# Patient Record
Sex: FEMALE | Race: WHITE | Employment: OTHER | URBAN - METROPOLITAN AREA
[De-identification: names, ages, dates, MRNs, and addresses within clinical notes are randomized per-mention and may not be internally consistent; named-entity substitution may affect disease eponyms.]

---

## 2017-01-09 ENCOUNTER — OFFICE VISIT (OUTPATIENT)
Dept: ORTHOPEDIC SURGERY | Age: 77
End: 2017-01-09

## 2017-01-09 VITALS
BODY MASS INDEX: 27.47 KG/M2 | DIASTOLIC BLOOD PRESSURE: 81 MMHG | HEIGHT: 67 IN | WEIGHT: 175.04 LBS | HEART RATE: 58 BPM | SYSTOLIC BLOOD PRESSURE: 185 MMHG

## 2017-01-09 DIAGNOSIS — T84.89XD POSTOPERATIVE STIFFNESS OF TOTAL KNEE REPLACEMENT, SUBSEQUENT ENCOUNTER: ICD-10-CM

## 2017-01-09 DIAGNOSIS — Z96.659 POSTOPERATIVE STIFFNESS OF TOTAL KNEE REPLACEMENT, SUBSEQUENT ENCOUNTER: ICD-10-CM

## 2017-01-09 DIAGNOSIS — M17.12 PRIMARY OSTEOARTHRITIS OF LEFT KNEE: Primary | Chronic | ICD-10-CM

## 2017-01-09 DIAGNOSIS — M25.669 POSTOPERATIVE STIFFNESS OF TOTAL KNEE REPLACEMENT, SUBSEQUENT ENCOUNTER: ICD-10-CM

## 2017-01-09 DIAGNOSIS — T84.82XD ARTHROFIBROSIS OF TOTAL KNEE ARTHROPLASTY, SUBSEQUENT ENCOUNTER: ICD-10-CM

## 2017-01-09 DIAGNOSIS — Z96.652 STATUS POST TOTAL LEFT KNEE REPLACEMENT: ICD-10-CM

## 2017-01-09 PROCEDURE — 99213 OFFICE O/P EST LOW 20 MIN: CPT | Performed by: ORTHOPAEDIC SURGERY

## 2017-01-09 RX ORDER — ASPIRIN 325 MG
325 TABLET ORAL DAILY
COMMUNITY

## 2017-01-09 RX ORDER — SOLIFENACIN SUCCINATE 10 MG/1
5 TABLET, FILM COATED ORAL DAILY
COMMUNITY
End: 2019-11-20

## 2017-01-09 RX ORDER — GABAPENTIN 300 MG/1
300 CAPSULE ORAL 2 TIMES DAILY
Qty: 60 CAPSULE | Refills: 3 | Status: SHIPPED | OUTPATIENT
Start: 2017-01-09 | End: 2017-10-09 | Stop reason: SDUPTHER

## 2017-01-09 RX ORDER — OXYBUTYNIN CHLORIDE 5 MG/1
5 TABLET, EXTENDED RELEASE ORAL DAILY
COMMUNITY
End: 2019-11-20

## 2017-10-09 ENCOUNTER — OFFICE VISIT (OUTPATIENT)
Dept: ORTHOPEDIC SURGERY | Age: 77
End: 2017-10-09

## 2017-10-09 VITALS
WEIGHT: 175 LBS | BODY MASS INDEX: 27.47 KG/M2 | SYSTOLIC BLOOD PRESSURE: 128 MMHG | HEIGHT: 67 IN | HEART RATE: 63 BPM | DIASTOLIC BLOOD PRESSURE: 77 MMHG

## 2017-10-09 DIAGNOSIS — M25.669 POSTOPERATIVE STIFFNESS OF TOTAL KNEE REPLACEMENT, SUBSEQUENT ENCOUNTER: ICD-10-CM

## 2017-10-09 DIAGNOSIS — T84.82XD ARTHROFIBROSIS OF TOTAL KNEE ARTHROPLASTY, SUBSEQUENT ENCOUNTER: Primary | ICD-10-CM

## 2017-10-09 DIAGNOSIS — Z96.653 STATUS POST TOTAL BILATERAL KNEE REPLACEMENT: ICD-10-CM

## 2017-10-09 DIAGNOSIS — Z96.659 POSTOPERATIVE STIFFNESS OF TOTAL KNEE REPLACEMENT, SUBSEQUENT ENCOUNTER: ICD-10-CM

## 2017-10-09 DIAGNOSIS — T84.89XD POSTOPERATIVE STIFFNESS OF TOTAL KNEE REPLACEMENT, SUBSEQUENT ENCOUNTER: ICD-10-CM

## 2017-10-09 PROCEDURE — 73562 X-RAY EXAM OF KNEE 3: CPT | Performed by: ORTHOPAEDIC SURGERY

## 2017-10-09 PROCEDURE — 20610 DRAIN/INJ JOINT/BURSA W/O US: CPT | Performed by: ORTHOPAEDIC SURGERY

## 2017-10-09 PROCEDURE — 99213 OFFICE O/P EST LOW 20 MIN: CPT | Performed by: ORTHOPAEDIC SURGERY

## 2017-10-09 RX ORDER — EZETIMIBE 10 MG/1
TABLET ORAL
COMMUNITY
Start: 2017-08-28 | End: 2019-11-20

## 2017-10-09 RX ORDER — HYDROCODONE BITARTRATE AND ACETAMINOPHEN 5; 325 MG/1; MG/1
TABLET ORAL
COMMUNITY

## 2017-10-09 RX ORDER — TROSPIUM CHLORIDE 20 MG/1
20 TABLET, FILM COATED ORAL
COMMUNITY
Start: 2017-08-01 | End: 2019-11-20

## 2017-10-09 RX ORDER — GABAPENTIN 300 MG/1
300 CAPSULE ORAL 3 TIMES DAILY
Qty: 90 CAPSULE | Refills: 0 | Status: SHIPPED | OUTPATIENT
Start: 2017-10-09

## 2017-10-09 ASSESSMENT — ENCOUNTER SYMPTOMS
BACK PAIN: 1
SHORTNESS OF BREATH: 1

## 2017-10-09 NOTE — PROGRESS NOTES
History of Present Illness:    Barbara Brown is a 68 y.o. female   follow-up evaluation for bilateral total knees, left knee, doing well. Right knee having pain      Chief Complaint , a summary of previous treatments, injury, and current reason for the visit:    She's had persistent pain at the MP aspect of this wound incision site. It's not in the knee, but the scar site itself at the tibial tubercle with hypersensitivity. It was improved with gabapentin    Examination:    No evidence of infection. Extreme sensitivity. The infrapatellar tendon area. Saphenous nerve irritation with direct pain to pressure        Office Procedures:  Orders Placed This Encounter   Procedures    XR KNEE RIGHT (3 VIEWS)     Order Specific Question:   Reason for exam:     Answer:   Pain and discomfort right knee         X-ray interpretation:  X-rays:    3 view x-rays right knee. Right knee. An anatomic alignment. There is no component abnormality    Treatment Plan:  Recommendation is injection at the infrapatellar tendon area  PROCEDURE NOTE: Right KNEE ASPIRATION & INJECTION  10/9/2017 at 5:25 PM   Procedure: Aspiration & Injection  Verbal consent was obtained. Risks and benefits were explained. Questions were encouraged and answered. Timeout Verification Completed including:    Correct patient: Barbara Brown was identified    Correct procedure    Correct site & side    Correct equipment and supplies          The aspiration/injection site (superolateral) was prepped with Chlora-Prep using aseptic technique and the knee aspiration and intra-articular injection was performed with ethyl chloride & 1% lidocaine (2 ml) for anesthetic:      ARTHROCENTESIS:  Injection of treatment. Following negative aspiration at the area of the inferior tibial tubercle tendon area directly into the area of the saphenous nerve    INJECTION:  Depomedrol 4ml (40 mg/ml = 160 mg total) was injected.   A sterile adhesive dressing was applied. Post procedure: The patient tolerated the treatment well. Instructions to patient:  Appropriate post aspiration/injections instructions were given to the patient. Final impresson and disposition: Right total knee with excellent alignment. Good range of motion. Injection given. 2.  Initiate short course of gabapentin, no refills of been given warned that this is now a control product. All refills much comfortable family doctor                      Rafita Malin M.D. This dictation was performed with a verbal recognition program and it was checked for errors. It is possible that there are still dictated errors within this office note. Any errors should be brought immediately to my attention for correction.   All efforts were made to ensure that this office note is accurate

## 2017-10-30 RX ORDER — METHYLPREDNISOLONE ACETATE 80 MG/ML
80 INJECTION, SUSPENSION INTRA-ARTICULAR; INTRALESIONAL; INTRAMUSCULAR; SOFT TISSUE ONCE
Status: SHIPPED | OUTPATIENT
Start: 2017-10-30

## 2019-11-20 ENCOUNTER — OFFICE VISIT (OUTPATIENT)
Dept: ORTHOPEDIC SURGERY | Age: 79
End: 2019-11-20
Payer: MEDICARE

## 2019-11-20 VITALS
SYSTOLIC BLOOD PRESSURE: 150 MMHG | WEIGHT: 175 LBS | DIASTOLIC BLOOD PRESSURE: 70 MMHG | HEART RATE: 62 BPM | HEIGHT: 67 IN | BODY MASS INDEX: 27.47 KG/M2

## 2019-11-20 DIAGNOSIS — M79.641 RIGHT HAND PAIN: Primary | ICD-10-CM

## 2019-11-20 DIAGNOSIS — M65.331 TRIGGER MIDDLE FINGER OF RIGHT HAND: ICD-10-CM

## 2019-11-20 PROCEDURE — 4040F PNEUMOC VAC/ADMIN/RCVD: CPT | Performed by: ORTHOPAEDIC SURGERY

## 2019-11-20 PROCEDURE — 1123F ACP DISCUSS/DSCN MKR DOCD: CPT | Performed by: ORTHOPAEDIC SURGERY

## 2019-11-20 PROCEDURE — G8400 PT W/DXA NO RESULTS DOC: HCPCS | Performed by: ORTHOPAEDIC SURGERY

## 2019-11-20 PROCEDURE — G8598 ASA/ANTIPLAT THER USED: HCPCS | Performed by: ORTHOPAEDIC SURGERY

## 2019-11-20 PROCEDURE — 20605 DRAIN/INJ JOINT/BURSA W/O US: CPT | Performed by: ORTHOPAEDIC SURGERY

## 2019-11-20 PROCEDURE — G8427 DOCREV CUR MEDS BY ELIG CLIN: HCPCS | Performed by: ORTHOPAEDIC SURGERY

## 2019-11-20 PROCEDURE — 1036F TOBACCO NON-USER: CPT | Performed by: ORTHOPAEDIC SURGERY

## 2019-11-20 PROCEDURE — G8484 FLU IMMUNIZE NO ADMIN: HCPCS | Performed by: ORTHOPAEDIC SURGERY

## 2019-11-20 PROCEDURE — 1090F PRES/ABSN URINE INCON ASSESS: CPT | Performed by: ORTHOPAEDIC SURGERY

## 2019-11-20 PROCEDURE — G8417 CALC BMI ABV UP PARAM F/U: HCPCS | Performed by: ORTHOPAEDIC SURGERY

## 2019-11-20 PROCEDURE — 99203 OFFICE O/P NEW LOW 30 MIN: CPT | Performed by: ORTHOPAEDIC SURGERY

## 2019-12-18 ENCOUNTER — OFFICE VISIT (OUTPATIENT)
Dept: ORTHOPEDIC SURGERY | Age: 79
End: 2019-12-18
Payer: MEDICARE

## 2019-12-18 VITALS
SYSTOLIC BLOOD PRESSURE: 158 MMHG | BODY MASS INDEX: 27.47 KG/M2 | WEIGHT: 175 LBS | HEART RATE: 62 BPM | DIASTOLIC BLOOD PRESSURE: 78 MMHG | HEIGHT: 67 IN

## 2019-12-18 DIAGNOSIS — M79.641 RIGHT HAND PAIN: ICD-10-CM

## 2019-12-18 DIAGNOSIS — M65.331 TRIGGER MIDDLE FINGER OF RIGHT HAND: Primary | ICD-10-CM

## 2019-12-18 PROCEDURE — 1123F ACP DISCUSS/DSCN MKR DOCD: CPT | Performed by: ORTHOPAEDIC SURGERY

## 2019-12-18 PROCEDURE — G8598 ASA/ANTIPLAT THER USED: HCPCS | Performed by: ORTHOPAEDIC SURGERY

## 2019-12-18 PROCEDURE — G8484 FLU IMMUNIZE NO ADMIN: HCPCS | Performed by: ORTHOPAEDIC SURGERY

## 2019-12-18 PROCEDURE — 99212 OFFICE O/P EST SF 10 MIN: CPT | Performed by: ORTHOPAEDIC SURGERY

## 2019-12-18 PROCEDURE — 4040F PNEUMOC VAC/ADMIN/RCVD: CPT | Performed by: ORTHOPAEDIC SURGERY

## 2019-12-18 PROCEDURE — 1036F TOBACCO NON-USER: CPT | Performed by: ORTHOPAEDIC SURGERY

## 2019-12-18 PROCEDURE — G8417 CALC BMI ABV UP PARAM F/U: HCPCS | Performed by: ORTHOPAEDIC SURGERY

## 2019-12-18 PROCEDURE — 1090F PRES/ABSN URINE INCON ASSESS: CPT | Performed by: ORTHOPAEDIC SURGERY

## 2019-12-18 PROCEDURE — G8427 DOCREV CUR MEDS BY ELIG CLIN: HCPCS | Performed by: ORTHOPAEDIC SURGERY

## 2019-12-18 PROCEDURE — G8400 PT W/DXA NO RESULTS DOC: HCPCS | Performed by: ORTHOPAEDIC SURGERY

## 2020-07-31 ENCOUNTER — OFFICE VISIT (OUTPATIENT)
Dept: ORTHOPEDIC SURGERY | Age: 80
End: 2020-07-31
Payer: MEDICARE

## 2020-07-31 VITALS — WEIGHT: 175 LBS | BODY MASS INDEX: 27.47 KG/M2 | HEIGHT: 67 IN | TEMPERATURE: 97.5 F

## 2020-07-31 PROCEDURE — G8427 DOCREV CUR MEDS BY ELIG CLIN: HCPCS | Performed by: ORTHOPAEDIC SURGERY

## 2020-07-31 PROCEDURE — 1036F TOBACCO NON-USER: CPT | Performed by: ORTHOPAEDIC SURGERY

## 2020-07-31 PROCEDURE — 20610 DRAIN/INJ JOINT/BURSA W/O US: CPT | Performed by: ORTHOPAEDIC SURGERY

## 2020-07-31 PROCEDURE — 4040F PNEUMOC VAC/ADMIN/RCVD: CPT | Performed by: ORTHOPAEDIC SURGERY

## 2020-07-31 PROCEDURE — G8400 PT W/DXA NO RESULTS DOC: HCPCS | Performed by: ORTHOPAEDIC SURGERY

## 2020-07-31 PROCEDURE — G8417 CALC BMI ABV UP PARAM F/U: HCPCS | Performed by: ORTHOPAEDIC SURGERY

## 2020-07-31 PROCEDURE — 1123F ACP DISCUSS/DSCN MKR DOCD: CPT | Performed by: ORTHOPAEDIC SURGERY

## 2020-07-31 PROCEDURE — 99214 OFFICE O/P EST MOD 30 MIN: CPT | Performed by: ORTHOPAEDIC SURGERY

## 2020-07-31 PROCEDURE — 1090F PRES/ABSN URINE INCON ASSESS: CPT | Performed by: ORTHOPAEDIC SURGERY

## 2020-07-31 RX ORDER — ROPIVACAINE HYDROCHLORIDE 5 MG/ML
30 INJECTION, SOLUTION EPIDURAL; INFILTRATION; PERINEURAL ONCE
Status: COMPLETED | OUTPATIENT
Start: 2020-07-31 | End: 2020-07-31

## 2020-07-31 RX ORDER — METHYLPREDNISOLONE ACETATE 40 MG/ML
40 INJECTION, SUSPENSION INTRA-ARTICULAR; INTRALESIONAL; INTRAMUSCULAR; SOFT TISSUE ONCE
Status: COMPLETED | OUTPATIENT
Start: 2020-07-31 | End: 2020-07-31

## 2020-07-31 RX ADMIN — ROPIVACAINE HYDROCHLORIDE 30 ML: 5 INJECTION, SOLUTION EPIDURAL; INFILTRATION; PERINEURAL at 10:14

## 2020-07-31 RX ADMIN — METHYLPREDNISOLONE ACETATE 40 MG: 40 INJECTION, SUSPENSION INTRA-ARTICULAR; INTRALESIONAL; INTRAMUSCULAR; SOFT TISSUE at 10:13

## 2020-07-31 NOTE — PROGRESS NOTES
Date:  2020    Name:  Odilon [facility]  Address:  [address] [place] [address] Dr. Jake Buckner [id number]    :  1940      Age:   [de-identified] y.o.    SSN:  [id number]      Medical Record Number:  <[id number]>    Reason for Visit:    Chief Complaint    Knee Pain (Baumann Clock -- no new injury )      DOS:2020     HPI: Cristhian Leon is a [de-identified] y.o. female here today for evaluation of chronic right knee pain. She has a history of right total knee arthroplasty in  by Dr Natividad Hauser that was complicated by arthrofibrosis requiring CORNELL and major synevectomy one year later in . She has had continued chronic pain She had worsening pain after having cardiac stents placed 2020. She is taking 1/2 tab norco up to twice to day to help her sleep. She can not take NSAIDs due to multiple medical conditions. She had no history of wound healing problems. She denies redness, swelling, or discharge. Denies any recent injuries or falls. Pain Assessment  Location of Pain: Knee  Severity of Pain: 8  Quality of Pain: Aching  Duration of Pain: Persistent  Frequency of Pain: Constant  Date Pain First Started: (ongoing for years)  Aggravating Factors: Bending, Stretching, Kneeling, Standing, Walking, Stairs  Limiting Behavior: Some  Relieving Factors: Rest  Result of Injury: No  Work-Related Injury: No  Are there other pain locations you wish to document?: No     ROS: Review of systems reviewed from Patient History Form completed today and available in the patient's chart under the Media tab.        Past Medical History:   Diagnosis Date    Anxiety     Arthritis     knees    Back pain     intermittent    CAD (coronary artery disease)     Diabetes mellitus (HCC)     diet controlled    History of arterial disease     s/p femoral stenting    Hyperlipidemia     Hypertension     Other abnormal clinical finding     slow to awaken during last recovery    Pain     5/10/ Right Knee    SOB (shortness of breath) on exertion     SECONDARY DUE TO KNEE PAIN  Thyroid disease     Urinary frequency         Past Surgical History:   Procedure Laterality Date    CHOLECYSTECTOMY      COLONOSCOPY      CORONARY ANGIOPLASTY WITH STENT PLACEMENT  2006    X1 in heart    HYSTERECTOMY  1986    JOINT REPLACEMENT Right 11/15/11    knee    KNEE ARTHROPLASTY Left     KNEE ARTHROPLASTY Left      ACTUAL PROCEDURE: LEFT TOTAL KNEE ARTHROPLASTY,RIGHT KNEE MANIPULATION    KNEE ARTHROSCOPY      bilat. several times    KNEE JOINT MANIPULATION  [id number]    Right knee    VASCULAR SURGERY      bilat. femoral stents       No family history on file. Social History     Socioeconomic History    Marital status:       Spouse name: None    Number of children: None    Years of education: None    Highest education level: None   Occupational History    None   Social Needs    Financial resource strain: None    Food insecurity     Worry: None     Inability: None    Transportation needs     Medical: None     Non-medical: None   Tobacco Use    Smoking status: Former Smoker     Packs/day: 0.25     Years: 36.00     Pack years: 9.00     Last attempt to quit: 11/15/1978     Years since quittin.7    Smokeless tobacco: Never Used    Tobacco comment: quit    Substance and Sexual Activity    Alcohol use: No    Drug use: No    Sexual activity: None   Lifestyle    Physical activity     Days per week: None     Minutes per session: None    Stress: None   Relationships    Social connections     Talks on phone: None     Gets together: None     Attends Cheondoism service: None     Active member of club or organization: None     Attends meetings of clubs or organizations: None     Relationship status: None    Intimate partner violence     Fear of current or ex partner: None     Emotionally abused: None     Physically abused: None     Forced sexual activity: None   Other Topics Concern    None   Social History Narrative    None       Current Outpatient Medications Constitutional: The physical examination finds the patient to be well-developed and well-nourished. The patient is alert and oriented x3 and was cooperative throughout the visit. Neuro: no focal deficits noted. Normal mood, judgement, decision making  Eyes: sclera clear, EOMI  Ears: Normal external ear  Mouth:  No perioral lesions  Pulm: Respirations unlabored and regular  Pulse: Extremities well perfused, warm, capillary refill < 2 seconds  Musculoskeletal:    Gait: With antalgia, no assistive devices    Right knee exam    Alignment: normal alignment. Inspection/skin: well healed surgical incisions without erythema or ecchymosis. No gross deformity. Palpation: mild crepitus. Tender to palpation anterior knee, exquisitely tender to palpation pes anserine bursa    Range of Motion: 5-90 degrees, virtually no patellar translation    Strength: quadriceps atrophy. Effusion: No effusion or swelling present. Ligamentous stability: No cruciate or collateral ligament instability. Neurologic and vascular: Skin is warm and well-perfused. Sensation is intact to light-touch. 2+ dorsalis pedis pulse      Left knee comparison exam    Alignment: normal alignment. Inspection/skin: well healed midline surgical incision. Skin is intact without erythema or ecchymosis. No gross deformity. Palpation: mild crepitus. no joint line tenderness present. Range of Motion: 3-110     Strength: Normal quadriceps development. Effusion: No effusion or swelling present. Ligamentous stability: No cruciate or collateral ligament instability. Neurologic and vascular: Skin is warm and well-perfused. Sensation is intact to light-touch. Diagnostics:  Radiology:       Pertinent imaging reviewed, images only - no report available.     Radiographs were obtained and reviewed in the office; 3 views right knee bilateral PA, bilateral merchant, and left lateral     Impression: no signs of loosening, lucent lines less than 1 mm only minimally changed from radiographs taken in 2017. Patella baja. No fracture or dislocaiton. Assessment: Painful right total knee arthroplasty with pes anserine bursitis    Plan: Ms Carola Chadwick has had a painful and stiff right TKA for many year, recently worsening over the past 6 months. She has decided pes bursitis on examination for which we gave her a corticosteroid injection today. She had immediate pain relief. Procedure: Right Knee pes bursa corticosteroid injection  After informed consent was provided, the skin was cleansed and prepped. Using a 25 gauge needle an injection with 2 mL of 40 mg/ml Depo-Medrol and 3 mL of 0.5% ropivicaine was injected without difficulty. The needle was withdrawn and the puncture site sealed with a Band-Aid. The patient tolerated the procedure well. We are sending her also for infectious labs given her history of multiple surgeries, stiffness and chronic pain. We'll see her back in 4 weeks or earlier should she have worsening of symptoms or there is a concern on her labs. Dean Renee is in agreement with this plan. All questions were answered to patient's satisfaction and was encouraged to call with any further questions. Pham Davis MD  Board Certified Orthopedic Surgeon, [address] [place] [address]     The encounter with Deepthi Engle was supervised by Dr Zee Shaver who personally examined the patient and reviewed the plan. This dictation was performed with a verbal recognition program (DRAGON) and it was checked for errors. It is possible that there are still dictated errors within this office note. If so, please bring any errors to my attention for an addendum. All efforts were made to ensure that this office note is accurate.      Attending Attestation:    I, Dr. Zee Shaver, supervised my sports medicine fellow in the evaluation and development of a treatment plan  for this patient. I personally interviewed the patient and performed a physical examination. In addition, I discussed the patient's condition and treatment options with them. All of their questions were answered. Attestation:  I was physically present and performed my own examination of this patient and have discussed the case, including pertinent history and exam findings with the fellow. I agree with the documented assessment and plan. Bere Johnson.  Minerva Stephen MD

## 2021-05-12 ENCOUNTER — OFFICE VISIT (OUTPATIENT)
Dept: ORTHOPEDIC SURGERY | Age: 81
End: 2021-05-12
Payer: MEDICARE

## 2021-05-12 VITALS — HEIGHT: 65 IN | BODY MASS INDEX: 28.32 KG/M2 | TEMPERATURE: 98.6 F | WEIGHT: 170 LBS

## 2021-05-12 DIAGNOSIS — M19.031 ARTHRITIS OF WRIST, RIGHT: ICD-10-CM

## 2021-05-12 DIAGNOSIS — M25.531 RIGHT WRIST PAIN: Primary | ICD-10-CM

## 2021-05-12 PROCEDURE — 1123F ACP DISCUSS/DSCN MKR DOCD: CPT | Performed by: ORTHOPAEDIC SURGERY

## 2021-05-12 PROCEDURE — G8417 CALC BMI ABV UP PARAM F/U: HCPCS | Performed by: ORTHOPAEDIC SURGERY

## 2021-05-12 PROCEDURE — 4040F PNEUMOC VAC/ADMIN/RCVD: CPT | Performed by: ORTHOPAEDIC SURGERY

## 2021-05-12 PROCEDURE — 1090F PRES/ABSN URINE INCON ASSESS: CPT | Performed by: ORTHOPAEDIC SURGERY

## 2021-05-12 PROCEDURE — 1036F TOBACCO NON-USER: CPT | Performed by: ORTHOPAEDIC SURGERY

## 2021-05-12 PROCEDURE — G8427 DOCREV CUR MEDS BY ELIG CLIN: HCPCS | Performed by: ORTHOPAEDIC SURGERY

## 2021-05-12 PROCEDURE — 20605 DRAIN/INJ JOINT/BURSA W/O US: CPT | Performed by: ORTHOPAEDIC SURGERY

## 2021-05-12 PROCEDURE — G8400 PT W/DXA NO RESULTS DOC: HCPCS | Performed by: ORTHOPAEDIC SURGERY

## 2021-05-12 PROCEDURE — 99213 OFFICE O/P EST LOW 20 MIN: CPT | Performed by: ORTHOPAEDIC SURGERY

## 2021-05-12 RX ORDER — METHYLPREDNISOLONE ACETATE 40 MG/ML
40 INJECTION, SUSPENSION INTRA-ARTICULAR; INTRALESIONAL; INTRAMUSCULAR; SOFT TISSUE ONCE
Status: COMPLETED | OUTPATIENT
Start: 2021-05-12 | End: 2021-05-12

## 2021-05-12 RX ORDER — ROPIVACAINE HYDROCHLORIDE 5 MG/ML
30 INJECTION, SOLUTION EPIDURAL; INFILTRATION; PERINEURAL ONCE
Status: COMPLETED | OUTPATIENT
Start: 2021-05-12 | End: 2021-05-12

## 2021-05-12 RX ADMIN — METHYLPREDNISOLONE ACETATE 40 MG: 40 INJECTION, SUSPENSION INTRA-ARTICULAR; INTRALESIONAL; INTRAMUSCULAR; SOFT TISSUE at 10:39

## 2021-05-12 RX ADMIN — ROPIVACAINE HYDROCHLORIDE 30 ML: 5 INJECTION, SOLUTION EPIDURAL; INFILTRATION; PERINEURAL at 10:40

## 2021-05-12 NOTE — PROGRESS NOTES
Patient returns today for new problem. She said she was weeding the garden and also using her tremors to trim branches about a month ago and since that time her right wrist has been sore. She says is done really hurting where she pushes on it but it does hurt when she writes and just does other activities. She has not been on any anti-inflammatory medication. She does have a carpal tunnel splint which she has worn which she says did not help much. She says she wears an Ace wrap. This helps some. She has had a previous trigger finger which I injected in the past and resolve the issue. ROS: Pertinent items are noted in HPI. No notes on file    Past Medical History:  No date: Anxiety  No date: Arthritis      Comment:  knees  No date: Back pain      Comment:  intermittent  No date: CAD (coronary artery disease)  No date: Diabetes mellitus (HCC)      Comment:  diet controlled  No date: History of arterial disease      Comment:  s/p femoral stenting  No date: Hyperlipidemia  No date: Hypertension  No date: Other abnormal clinical finding      Comment:  slow to awaken during last recovery  No date: Pain      Comment:  5/10/ Right Knee  No date: SOB (shortness of breath) on exertion      Comment:  SECONDARY DUE TO KNEE PAIN  No date: Thyroid disease  No date: Urinary frequency     Past Surgical History:  1978: CHOLECYSTECTOMY  No date: COLONOSCOPY  2006: CORONARY ANGIOPLASTY WITH STENT PLACEMENT      Comment:  X1 in heart  1986: HYSTERECTOMY  11/15/11: JOINT REPLACEMENT; Right      Comment:  knee  No date: KNEE ARTHROPLASTY; Left  No date: KNEE ARTHROPLASTY; Left      Comment:   ACTUAL PROCEDURE: LEFT TOTAL KNEE ARTHROPLASTY,RIGHT                KNEE MANIPULATION  No date: KNEE ARTHROSCOPY      Comment:  bilat. several times  [id number]: KNEE JOINT MANIPULATION      Comment:  Right knee  2001: VASCULAR SURGERY      Comment:  bilat. femoral stents    History reviewed. No pertinent family history.       Social History    Socioeconomic History      Marital status:        Spouse name: None      Number of children: None      Years of education: None      Highest education level: None    Occupational History      None    Social Needs      Financial resource strain: None      Food insecurity        Worry: None        Inability: None      Transportation needs        Medical: None        Non-medical: None    Tobacco Use      Smoking status: Former Smoker        Packs/day: 0.25        Years: 36.00        Pack years: 9        Quit date: 11/15/1978        Years since quittin.5      Smokeless tobacco: Never Used      Tobacco comment: quit     Substance and Sexual Activity      Alcohol use: No      Drug use: No      Sexual activity: None    Lifestyle      Physical activity        Days per week: None        Minutes per session: None      Stress: None    Relationships      Social connections        Talks on phone: None        Gets together: None        Attends Lutheran service: None        Active member of club or organization: None        Attends meetings of clubs or organizations: None        Relationship status: None      Intimate partner violence        Fear of current or ex partner: None        Emotionally abused: None        Physically abused: None        Forced sexual activity: None    Other Topics      Concerns:        None    Social History Narrative      None      Current Outpatient Medications:  HYDROcodone-acetaminophen (NORCO) 5-325 MG per tablet, Take by mouth ., Disp: , Rfl:   gabapentin (NEURONTIN) 300 MG capsule, Take 1 capsule by mouth 3 times daily, Disp: 90 capsule, Rfl: 0  aspirin 325 MG tablet, Take 325 mg by mouth daily, Disp: , Rfl:   levothyroxine (SYNTHROID) 100 MCG tablet, Take 100 mcg by mouth Daily takes 200 mcg on , Disp: , Rfl:   citalopram (CELEXA) 20 MG tablet, Take 20 mg by mouth daily, Disp: , Rfl:   vitamin B-12 (CYANOCOBALAMIN) 500 MCG tablet, Take 500 mcg by mouth daily, Disp: , Rfl:   spironolactone (ALDACTONE) 25 MG tablet, Take 25 mg by mouth Takes on Mon, Wed and Fri, Disp: , Rfl:   Multiple Vitamins-Minerals (MULTIVITAMIN PO), Take by mouth daily, Disp: , Rfl:   losartan-hydrochlorothiazide (HYZAAR) 100-12.5 MG per tablet, Take by mouth daily , Disp: , Rfl:   ezetimibe (ZETIA) 10 MG tablet, Take 10 mg by mouth Monday, Wed, and Friday, Disp: , Rfl:   ibuprofen (ADVIL;MOTRIN) 200 MG CAPS, Take 1 capsule by mouth as needed. , Disp: , Rfl:   pravastatin (PRAVACHOL) 40 MG tablet, Take 60 mg by mouth nightly , Disp: , Rfl:   amlodipine (NORVASC) 5 MG tablet, Take 5 mg by mouth Takes Tues, Thur and Sat., Disp: , Rfl:   vitamin D (CHOLECALCIFEROL) 1000 UNIT TABS tablet, Take 1,000 Int'l Units by mouth daily. , Disp: , Rfl:     Current Facility-Administered Medications:  methylPREDNISolone acetate (DEPO-MEDROL) injection 80 mg, 80 mg, Intra-articular, Once, Rashad Hedrick MD         -- Ezetimibe-Simvastatin -- Shortness Of Breath   -- Crestor [Rosuvastatin Calcium] -- Nausea Only   -- Cymbalta [Duloxetine Hcl]     --  \"CAN'T FUNCTION\"   -- Ticlopidine    -- Vytorin [Ezetimibe-Simvastatin]     --  \"Can't function\"   -- Dye [Iodides] -- Rash    --  severe   -- Plavix [Clopidogrel Bisulfate] -- Rash   -- Pletal [Cilostazol] -- Rash    VITAL SIGNS:  Temp 98.6 °F (37 °C)   Ht 5' 5\" (1.651 m)   Wt 170 lb (77.1 kg)   BMI 28.29 kg/m²   On examination today she lacks about 10 degrees of extension compared to her opposite side. She has almost no radial deviation on this wrist actively. She really has no palpable tenderness snuffbox dorsal or volar wrist.  She has full pronation and supination. There is no palpable ulnar-sided tenderness either. 3 views of the right wrist were obtained today. This shows mainly significant joint space narrowing at the scaphoid trapezial joint.   She may have slight subluxation of her basilar thumb joint although she is not tender there today and she has a negative grind test.  There are no intraosseous lesions or subchondral cysts noted. 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 Plan: We discussed her option to try some oral anti-inflammatories versus injection. She would like to try an injection. After sterile prep I injected intra-articularly into the right wrist with 40 mg of Depo-Medrol and 5 mg ropivacaine. The patient tolerated this procedure well. She is to return if this does not resolve her symptoms. Examination obtaining history evaluating x-rays injection and discussion of nonoperative treatments lasted 20 minutes.

## 2021-05-12 NOTE — PROGRESS NOTES
Administrations This Visit     methylPREDNISolone acetate (DEPO-MEDROL) injection 40 mg     Admin Date  05/12/2021  10:39 Action  Given Dose  40 mg Route  Intramuscular Site  Other Administered By  Penny Garcia    Ordering Provider: Sulema Tabor MD    NDC: 6662-3332-01    Lot#: [id number]    : [id number]  [facility].     Patient Supplied?: No    Comments: Rt Wrist          ropivacaine (NAROPIN) 0.5% injection 30 mL     Admin Date  05/12/2021  10:40 Action  Given Dose  30 mL Route  Epidural Site  Wrist Right Administered By  Penny Garcia    Ordering Provider: Sulema Tabor MD    NDC: 88386-614-90    Lot#: [id number]    : [id number] [facility]    Patient Supplied?: No

## 2022-06-07 ENCOUNTER — OFFICE VISIT (OUTPATIENT)
Dept: ORTHOPEDIC SURGERY | Age: 82
End: 2022-06-07
Payer: MEDICARE

## 2022-06-07 VITALS — BODY MASS INDEX: 28.32 KG/M2 | HEIGHT: 65 IN | WEIGHT: 170 LBS

## 2022-06-07 DIAGNOSIS — M25.562 LEFT KNEE PAIN, UNSPECIFIED CHRONICITY: Primary | ICD-10-CM

## 2022-06-07 DIAGNOSIS — M25.561 RIGHT KNEE PAIN, UNSPECIFIED CHRONICITY: ICD-10-CM

## 2022-06-07 PROCEDURE — G8427 DOCREV CUR MEDS BY ELIG CLIN: HCPCS | Performed by: ORTHOPAEDIC SURGERY

## 2022-06-07 PROCEDURE — G8417 CALC BMI ABV UP PARAM F/U: HCPCS | Performed by: ORTHOPAEDIC SURGERY

## 2022-06-07 PROCEDURE — 1036F TOBACCO NON-USER: CPT | Performed by: ORTHOPAEDIC SURGERY

## 2022-06-07 PROCEDURE — 1123F ACP DISCUSS/DSCN MKR DOCD: CPT | Performed by: ORTHOPAEDIC SURGERY

## 2022-06-07 PROCEDURE — G8400 PT W/DXA NO RESULTS DOC: HCPCS | Performed by: ORTHOPAEDIC SURGERY

## 2022-06-07 PROCEDURE — 1090F PRES/ABSN URINE INCON ASSESS: CPT | Performed by: ORTHOPAEDIC SURGERY

## 2022-06-07 PROCEDURE — 99203 OFFICE O/P NEW LOW 30 MIN: CPT | Performed by: ORTHOPAEDIC SURGERY

## 2022-06-07 RX ORDER — LIDOCAINE HYDROCHLORIDE 20 MG/ML
1.5 INJECTION, SOLUTION INFILTRATION; PERINEURAL ONCE
Status: CANCELLED | OUTPATIENT
Start: 2022-06-07 | End: 2022-06-07

## 2022-06-07 RX ORDER — METHYLPREDNISOLONE ACETATE 40 MG/ML
40 INJECTION, SUSPENSION INTRA-ARTICULAR; INTRALESIONAL; INTRAMUSCULAR; SOFT TISSUE ONCE
Status: CANCELLED | OUTPATIENT
Start: 2022-06-07 | End: 2022-06-07

## 2022-06-07 NOTE — PROGRESS NOTES
[facility]  History and Physical  Knee Pain    Date:  2022    Name:  Thomas Pagan  Address:  [address] [place] [address] Dr. Amanda Quiros [id number]    :  1940      Age:   80 y.o.    SSN:  [id number]      Medical Record Number:  [id number]      Chief Complaint    Knee Pain (right knee pain)      History of Present Illness:  Thomas Pagan is a 80 y.o. female who presents for right knee pain. 61-year-old female patient. S/p bilateral knee replacement by Dr. Candice Fernandez. Right on  which was complicated by arthrofibrosis and underwent release of adhesions. Left total knee replacement on . Patient had chronic right knee pain. 2 weeks ago she twisted her knee in the garden and felt inner side pain. Pain did not disappear with icing. Patient here today for follow-up. The patient denies any clicking, popping, catching, giving way, joint locking, numbness, paresthesias, or weakness.       Pain Assessment  Location of Pain: Knee  Location Modifiers: Right  Severity of Pain: 8  Quality of Pain: Aching  Duration of Pain: A few minutes  Frequency of Pain: Intermittent  Aggravating Factors: Walking,Stairs  Limiting Behavior: Some  Result of Injury: No  Work-Related Injury: No  Are there other pain locations you wish to document?: No    Past Medical History:   Diagnosis Date    Anxiety     Arthritis     knees    Back pain     intermittent    CAD (coronary artery disease)     Diabetes mellitus (HCC)     diet controlled    History of arterial disease     s/p femoral stenting    Hyperlipidemia     Hypertension     Other abnormal clinical finding     slow to awaken during last recovery    Pain     5/10/ Right Knee    SOB (shortness of breath) on exertion     SECONDARY DUE TO KNEE PAIN    Thyroid disease     Urinary frequency         Past Surgical History:   Procedure Laterality Date    CHOLECYSTECTOMY      COLONOSCOPY      CORONARY ANGIOPLASTY WITH STENT PLACEMENT  2006    X1 in heart    HYSTERECTOMY (CERVIX STATUS UNKNOWN)  1986    JOINT REPLACEMENT Right 11/15/11    knee    KNEE ARTHROPLASTY Left     KNEE ARTHROPLASTY Left      ACTUAL PROCEDURE: LEFT TOTAL KNEE ARTHROPLASTY,RIGHT KNEE MANIPULATION    KNEE ARTHROSCOPY      bilat. several times    KNEE JOINT MANIPULATION  [id number]    Right knee    VASCULAR SURGERY      bilat. femoral stents       History reviewed. No pertinent family history. Social History     Socioeconomic History    Marital status:      Spouse name: None    Number of children: None    Years of education: None    Highest education level: None   Occupational History    None   Tobacco Use    Smoking status: Former Smoker     Packs/day: 0.25     Years: 36.00     Pack years: 9.00     Quit date: 11/15/1978     Years since quittin.5    Smokeless tobacco: Never Used    Tobacco comment: quit    Substance and Sexual Activity    Alcohol use: No    Drug use: No    Sexual activity: None   Other Topics Concern    None   Social History Narrative    None     Social Determinants of Health     Financial Resource Strain:     Difficulty of Paying Living Expenses: Not on file   Food Insecurity:     Worried About Running Out of Food in the Last Year: Not on file    Melania of Food in the Last Year: Not on file   Transportation Needs:     Lack of Transportation (Medical): Not on file    Lack of Transportation (Non-Medical):  Not on file   Physical Activity:     Days of Exercise per Week: Not on file    Minutes of Exercise per Session: Not on file   Stress:     Feeling of Stress : Not on file   Social Connections:     Frequency of Communication with Friends and Family: Not on file    Frequency of Social Gatherings with Friends and Family: Not on file    Attends Bahai Services: Not on file    Active Member of Clubs or Organizations: Not on file    Attends Club or Organization Meetings: Not on file    Marital Status: Not on file   Intimate Partner Violence:     Fear of Current or Ex-Partner: Not on file    Emotionally Abused: Not on file    Physically Abused: Not on file    Sexually Abused: Not on file   Housing Stability:     Unable to Pay for Housing in the Last Year: Not on file    Number of [place] in the Last Year: Not on file    Unstable Housing in the Last Year: Not on file       Current Outpatient Medications   Medication Sig Dispense Refill    HYDROcodone-acetaminophen ([address]) 5-325 MG per tablet Take by mouth .  gabapentin (NEURONTIN) 300 MG capsule Take 1 capsule by mouth 3 times daily 90 capsule 0    aspirin 325 MG tablet Take 325 mg by mouth daily      levothyroxine (SYNTHROID) 100 MCG tablet Take 100 mcg by mouth Daily takes 200 mcg on Sunday      citalopram (CELEXA) 20 MG tablet Take 20 mg by mouth daily      vitamin B-12 (CYANOCOBALAMIN) 500 MCG tablet Take 500 mcg by mouth daily      spironolactone (ALDACTONE) 25 MG tablet Take 25 mg by mouth Takes on Mon, Wed and Fri      Multiple Vitamins-Minerals (MULTIVITAMIN PO) Take by mouth daily      losartan-hydrochlorothiazide (HYZAAR) 100-12.5 MG per tablet Take by mouth daily       ezetimibe (ZETIA) 10 MG tablet Take 10 mg by mouth Monday, Wed, and Friday      ibuprofen (ADVIL;MOTRIN) 200 MG CAPS Take 1 capsule by mouth as needed.  pravastatin (PRAVACHOL) 40 MG tablet Take 60 mg by mouth nightly       amlodipine (NORVASC) 5 MG tablet Take 5 mg by mouth Takes Tues, Thur and Sat.  vitamin D (CHOLECALCIFEROL) 1000 UNIT TABS tablet Take 1,000 Int'l Units by mouth daily.          Current Facility-Administered Medications   Medication Dose Route Frequency Provider Last Rate Last Admin    methylPREDNISolone acetate (DEPO-MEDROL) injection 80 mg  80 mg Intra-artICUlar Once Consuelo Silva MD           Allergies   Allergen Reactions    Ezetimibe-Simvastatin Shortness Of Breath    Crestor [Rosuvastatin Calcium] Nausea Only    stable at 0 and 30°. L[employer]man's exhibits a solid endpoint. Negative posterior drawer. Negative Homans sign    Gait: Steady, Non antalgic, without the use of assistive devices    Alignment: No deformity  Neuro: Sensation equal bilateral lower extremities    Vascular: 2+ posterior tibialis pulse      Contralateral Knee Comparison Examination: Left    Inspection:  No effusion, ecchymosis, discoloration, erythema, excessive warmth. no gross deformities, no signs of infection. Palpation: There is no patellofemoral crepitus, there is no joint line tenderness. No other osseous or soft tissue tenderness around the knee. Negative calf tenderness    Range of Motion:  0-130 degrees without pain or difficulty    Strength:  5/5 quadriceps, 5/5 hamstrings    Special Tests:   No ligament instability, valgus and varus stress test are stable at 0 and 30°. Lachman's exhibits a solid endpoint. Negative posterior drawer. Negative Homans sign    Gait:  Steady, Non antalgic, without the use of assistive devices    Alignment: No Varus deformity    Neuro: Sensation equal bilateral lower extremities    Vascular: 2+ posterior tibialis pulse      Radiology:     X-rays obtained and reviewed in office: AP and merchant view of both knees and a lateral of the bilateral knee. Findings:   S/p bilateral total knee replacement. Implants in satisfactory position. No signs of loosening. No fracture. Office Procedures:  Orders Placed This Encounter   Procedures    XR KNEE RIGHT (3 VIEWS)     Standing Status:   Future     Number of Occurrences:   1     Standing Expiration Date:   6/3/2023     Order Specific Question:   Reason for exam:     Answer:   johana knee pain            Assessment: Patient is a 80 y.o. female right knee twisting injury with mild MCL sprain and irritation of saphenous nerve.     Visit Diagnoses       Codes    Left knee pain, unspecified chronicity    -  Primary M25.562    Right knee pain, unspecified chronicity     M25.561          No orders of the defined types were placed in this encounter. Medical decision making:  Patient's workup and evaluation were reviewed with the patient in the office today. All the patient's questions were answered while in the clinic. The patient is understanding of all instructions and agrees with the plan. Treatment Plan:    1. Right knee mild MCL sprain and saphenous nerve irritation for pain control using combo cream.  2. Activity modification  3. Ice 20 minutes ever 1-2 hours PRN  4. NSAIDs as needed  5. Rest   6. Elevation at least 2 inches above the heart with pillows supporting the joint underneath  7. Lightweight exercise/low impact exercise  8. Appropriate diet/weight management      Follow up: 6 weeks    This patient exhibits moderate complexity given previous history and physical exam, review of previous surgeries, obtaining independent history from the patient, review of the x-ray imaging and independent interpretation of imaging, and coordinating care with the patient as well as another physician. The patient is moderate risk for planning of an elective surgery with multiple comorbidities/risks. Total time spent for evaluation, education, and development of treatment plan: 31 minutes. Alessia Ruiz MD  Orthopedic Surgeon  [facility]), Clinical Fellow, [address] [place] [address]   6/7/2022      This dictation was performed with a verbal recognition program [facility] [employer]) and it was checked for errors. It is possible that there are still dictated errors within this office note. If so, please bring any errors to my attention for an addendum. All efforts were made to ensure that this office note is accurate. This dictation was performed with a verbal recognition program ([employer]) and it was checked for errors. It is possible that there are still dictated errors within this office note. If so, please bring any errors to my attention for an addendum. All efforts were made to ensure that this office note is accurate. Supervising Physician Attestation:  I, Dr. Geoff Velasquez, personally performed the services described in this documentation as scribed above, and it is both accurate and complete and I agree with all pertinent clinical information. I personally interviewed the patient and performed a physical examination and medical decision making. I discussed the patient's condition and treatment options and have  reviewed and agree with the past medical, family and social history unless otherwise noted. All of the patient's questions were answered.       Board Certified Orthopaedic Surgeon  [facility] and [address] and [address] [employer]  Professor of Corey W Y[employer]